# Patient Record
Sex: FEMALE | URBAN - NONMETROPOLITAN AREA
[De-identification: names, ages, dates, MRNs, and addresses within clinical notes are randomized per-mention and may not be internally consistent; named-entity substitution may affect disease eponyms.]

---

## 2021-05-07 ENCOUNTER — DOCTOR'S OFFICE (OUTPATIENT)
Dept: URBAN - NONMETROPOLITAN AREA CLINIC 1 | Facility: CLINIC | Age: 21
Setting detail: OPHTHALMOLOGY
End: 2021-05-07
Payer: COMMERCIAL

## 2021-05-07 DIAGNOSIS — H52.13: ICD-10-CM

## 2021-05-07 DIAGNOSIS — Z01.00: ICD-10-CM

## 2021-05-07 PROCEDURE — 92004 COMPRE OPH EXAM NEW PT 1/>: CPT | Performed by: OPTOMETRIST

## 2021-05-07 ASSESSMENT — REFRACTION_MANIFEST
OS_SPHERE: -0.25
OS_AXIS: 090
OS_VA2: 20/20-2
OS_CYLINDER: -0.50
OD_VA1: 20/20
OS_VA1: 20/20-2
OD_VA2: 20/20
OD_CYLINDER: SPH
OD_SPHERE: -0.25

## 2021-05-07 ASSESSMENT — REFRACTION_AUTOREFRACTION
OD_CYLINDER: SPH
OD_SPHERE: -0.50
OS_CYLINDER: -0.50
OS_SPHERE: -0.50
OS_AXIS: 90

## 2021-05-07 ASSESSMENT — SPHEQUIV_DERIVED
OS_SPHEQUIV: -0.75
OS_SPHEQUIV: -0.5

## 2021-05-07 ASSESSMENT — CONFRONTATIONAL VISUAL FIELD TEST (CVF)
OS_FINDINGS: FULL
OD_FINDINGS: FULL

## 2021-05-07 ASSESSMENT — VISUAL ACUITY
OD_BCVA: 20/30
OS_BCVA: 20/20

## 2021-05-07 ASSESSMENT — REFRACTION_CURRENTRX
OS_OVR_VA: 20/
OD_OVR_VA: 20/

## 2022-05-10 ENCOUNTER — DOCTOR'S OFFICE (OUTPATIENT)
Dept: URBAN - NONMETROPOLITAN AREA CLINIC 1 | Facility: CLINIC | Age: 22
Setting detail: OPHTHALMOLOGY
End: 2022-05-10
Payer: COMMERCIAL

## 2022-05-10 DIAGNOSIS — Z01.00: ICD-10-CM

## 2022-05-10 DIAGNOSIS — H52.13: ICD-10-CM

## 2022-05-10 PROCEDURE — 92014 COMPRE OPH EXAM EST PT 1/>: CPT | Performed by: OPTOMETRIST

## 2022-05-10 ASSESSMENT — CONFRONTATIONAL VISUAL FIELD TEST (CVF)
OD_FINDINGS: FULL
OS_FINDINGS: FULL

## 2022-05-10 ASSESSMENT — SPHEQUIV_DERIVED
OS_SPHEQUIV: -0.625
OS_SPHEQUIV: -0.25
OD_SPHEQUIV: -0.25

## 2022-05-10 ASSESSMENT — VISUAL ACUITY
OS_BCVA: 20/20
OD_BCVA: 20/40-

## 2022-05-10 ASSESSMENT — REFRACTION_AUTOREFRACTION
OD_CYLINDER: -1.50
OS_SPHERE: +0.25
OS_AXIS: 083
OS_CYLINDER: -1.00
OD_SPHERE: +0.50
OD_AXIS: 091

## 2022-05-10 ASSESSMENT — REFRACTION_MANIFEST
OD_CYLINDER: SPH
OD_SPHERE: -0.25
OS_AXIS: 085
OS_SPHERE: -0.25
OD_VA1: 20/20
OD_VA2: 20/20
OS_CYLINDER: -0.75
OS_VA2: 20/20-2
OS_VA1: 20/20-2

## 2022-05-10 ASSESSMENT — TONOMETRY
OD_IOP_MMHG: 15
OS_IOP_MMHG: 15

## 2022-05-10 ASSESSMENT — REFRACTION_CURRENTRX
OS_OVR_VA: 20/
OD_OVR_VA: 20/

## 2023-06-19 ENCOUNTER — RX ONLY (RX ONLY)
Age: 23
End: 2023-06-19

## 2023-06-19 ENCOUNTER — DOCTOR'S OFFICE (OUTPATIENT)
Dept: URBAN - NONMETROPOLITAN AREA CLINIC 1 | Facility: CLINIC | Age: 23
Setting detail: OPHTHALMOLOGY
End: 2023-06-19
Payer: COMMERCIAL

## 2023-06-19 DIAGNOSIS — H52.222: ICD-10-CM

## 2023-06-19 PROCEDURE — 92014 COMPRE OPH EXAM EST PT 1/>: CPT | Performed by: OPTOMETRIST

## 2023-06-19 ASSESSMENT — REFRACTION_AUTOREFRACTION
OD_CYLINDER: 0.00
OS_CYLINDER: -1.25
OD_SPHERE: -0.25
OS_SPHERE: +0.25
OS_AXIS: 088
OD_AXIS: 0

## 2023-06-19 ASSESSMENT — SPHEQUIV_DERIVED
OS_SPHEQUIV: -0.375
OD_SPHEQUIV: -0.25

## 2023-06-19 ASSESSMENT — TONOMETRY
OD_IOP_MMHG: 15
OS_IOP_MMHG: 15

## 2023-06-19 ASSESSMENT — REFRACTION_MANIFEST
OD_CYLINDER: SPH
OS_AXIS: 088
OD_VA1: 20/20
OS_VA1: 20/20-2
OD_VA2: 20/20
OS_SPHERE: PLANO
OD_SPHERE: -0.25
OS_CYLINDER: -1.25
OS_VA2: 20/20-2

## 2023-06-19 ASSESSMENT — CONFRONTATIONAL VISUAL FIELD TEST (CVF)
OS_FINDINGS: FULL
OD_FINDINGS: FULL

## 2023-06-19 ASSESSMENT — VISUAL ACUITY
OS_BCVA: 20/20-1
OD_BCVA: 20/25

## 2023-06-19 ASSESSMENT — REFRACTION_CURRENTRX
OD_OVR_VA: 20/
OS_OVR_VA: 20/

## 2024-06-21 ENCOUNTER — DOCTOR'S OFFICE (OUTPATIENT)
Dept: URBAN - NONMETROPOLITAN AREA CLINIC 1 | Facility: CLINIC | Age: 24
Setting detail: OPHTHALMOLOGY
End: 2024-06-21
Payer: COMMERCIAL

## 2024-06-21 DIAGNOSIS — H52.13: ICD-10-CM

## 2024-06-21 DIAGNOSIS — H52.222: ICD-10-CM

## 2024-06-21 PROCEDURE — 92014 COMPRE OPH EXAM EST PT 1/>: CPT | Performed by: OPTOMETRIST

## 2024-06-21 ASSESSMENT — CONFRONTATIONAL VISUAL FIELD TEST (CVF)
OD_FINDINGS: FULL
OS_FINDINGS: FULL

## 2024-11-04 ENCOUNTER — OFFICE VISIT (OUTPATIENT)
Dept: PODIATRY | Age: 24
End: 2024-11-04

## 2024-11-04 VITALS
HEART RATE: 84 BPM | SYSTOLIC BLOOD PRESSURE: 122 MMHG | HEIGHT: 66 IN | DIASTOLIC BLOOD PRESSURE: 80 MMHG | OXYGEN SATURATION: 98 % | TEMPERATURE: 98.1 F | BODY MASS INDEX: 28.57 KG/M2 | WEIGHT: 177.8 LBS

## 2024-11-04 DIAGNOSIS — M79.674 GREAT TOE PAIN, RIGHT: Primary | ICD-10-CM

## 2024-11-04 DIAGNOSIS — L60.0 IGTN (INGROWING TOE NAIL): ICD-10-CM

## 2024-11-04 PROCEDURE — 99203 OFFICE O/P NEW LOW 30 MIN: CPT | Performed by: STUDENT IN AN ORGANIZED HEALTH CARE EDUCATION/TRAINING PROGRAM

## 2024-11-04 PROCEDURE — 11765 WEDGE EXCISION SKN NAIL FOLD: CPT | Performed by: STUDENT IN AN ORGANIZED HEALTH CARE EDUCATION/TRAINING PROGRAM

## 2024-11-04 RX ORDER — NORETHINDRONE ACETATE AND ETHINYL ESTRADIOL 1.5-30(21)
1 KIT ORAL DAILY
COMMUNITY
Start: 2024-09-26

## 2024-11-04 RX ORDER — ROPINIROLE 0.25 MG/1
TABLET, FILM COATED ORAL
COMMUNITY

## 2024-11-04 RX ORDER — MUPIROCIN 20 MG/G
OINTMENT TOPICAL
COMMUNITY
Start: 2024-10-14

## 2024-11-04 NOTE — PATIENT INSTRUCTIONS
POST-OPERATIVE INSTRUCTIONS FOLLOWING NAIL REMOVAL    TODAY  Leave dressing intact. Rest. Take ibuprofen or tylenol as needed. By tomorrow most of the bleeding should stop. Some light bleeding the first 24 hours is normal.  TOMORROW AM  Remove the dressing, if it sticks, get it wet with water  Soak the toe in warm water for 10 minutes, you may add epsom salts if you want but they're not mandatory  Dry toe, apply small amount of neosporin or bacitracin or generic antibiotic ointment to the nail bed and cover with a standard bandaid  TOMORROW PM  Remove the dressing, if it sticks, get it wet with water  Soak the toe in warm water for 10 minutes, you may add epsom salts if you want  Dry toe, apply small amount of neosporin or bacitracin or generic antibiotic ointment to the nail bed and cover with a standard bandaid  NEXT 5 DAYS  Repeat steps 2 and 3.   After 5 days you can stop applying neosporin. Just clean the toe daily with soap and water and cover with a bandaid  Once all drainage stops, you can stop placing bandaid on the toe      You may notice some redness on the side of the nail bed. This is ok. Some minor bleeding or clear-yellowish drainage is normal. If your toe begins to drain thick, white creamy drainage (pus) call immediately. If you notice redness streaking onto the foot and ankle, call our office immediately. Infections are rare after these procedures but we may have to prescribe an antibiotic.

## 2024-11-04 NOTE — PROGRESS NOTES
"Ambulatory Visit  Name: Amelie Mccann      : 2000      MRN: [id number]  Encounter Provider: Samantha Baker DPM  Encounter Date: 2024   Encounter department: [facility] [place]    Assessment & Plan  Great toe pain, right         IGTN (ingrowing toe nail)           PLAN:  I reviewed clinical exam with patient in detail today. I have discussed with the patient the pathophysiology of this diagnosis and reviewed how the examination correlates with this diagnosis.  PCP note from 10/14/24 reviewed   Patient has acute right 1st toe lateral border IGTN. PNA nonperm performed as below.  Aftercare instructions given  F/u 2wks for recheck     Nail removal    Date/Time: 2024 10:30 AM    Performed by: Samantha Baker DPM  Authorized by: Samantha Baker DPM    Patient location:  Clinic  Indications / Diagnosis:  Right 1st toe lateral border IGTN  Universal Protocol:  procedure performed by consultantConsent: Verbal consent obtained.  Risks and benefits: risks, benefits and alternatives were discussed  Consent given by: patient  Time out: Immediately prior to procedure a \"time out\" was called to verify the correct patient, procedure, equipment, support staff and site/side marked as required.  Patient understanding: patient states understanding of the procedure being performed  Patient consent: the patient's understanding of the procedure matches consent given  Patient identity confirmed: verbally with patient    Location:     Foot:  R big toe  Pre-procedure details:     Skin preparation:  Betadine and alcohol  Anesthesia (see MAR for exact dosages):     Anesthesia method:  Local infiltration    Local anesthetic:  Lidocaine 1% w/o epi  Nail Removal:     Nail removed:  Partial    Nail side:  Lateral    Nail bed sutured: no    Ingrown nail:     Wedge excision of skin: yes (silver nitrate used to cauterize)      Nail matrix removed or ablated:  None  Post-procedure details:     Dressing:  " "4x4 sterile gauze and antibiotic ointment    Patient tolerance of procedure:  Tolerated well, no immediate complications       History of Present Illness     Amelie Mccann is a 23 y.o. female who presents to clinic today concerning possible right IGTN. Notes pain, drainage for a few weeks. Tried to cut nail out by herself which helped a bit but the pain returned. Using abx ointment rx'd by PCP.       Review of Systems   Constitutional:  Negative for activity change, chills and fever.   HENT: Negative.     Respiratory:  Negative for cough, chest tightness and shortness of breath.    Cardiovascular:  Negative for chest pain and leg swelling.   Endocrine: Negative.    Genitourinary: Negative.    Neurological: Negative.  Negative for numbness.   Psychiatric/Behavioral: Negative.  Negative for agitation and behavioral problems.            Objective     /80 (BP Location: Left arm, Patient Position: Sitting, Cuff Size: Standard)   Pulse 84   Temp 98.1 °F (36.7 °C) (Temporal)   Ht 5' 6\" (1.676 m)   Wt 80.6 kg (177 lb 12.8 oz)   SpO2 98%   BMI 28.70 kg/m²     Physical Exam  Vitals and nursing note reviewed.   Constitutional:       General: She is not in acute distress.     Appearance: She is well-developed.   Pulmonary:      Effort: Pulmonary effort is normal. No respiratory distress.   Musculoskeletal:         General: Tenderness (R1 lateral border) present. No swelling.   Skin:     General: Skin is warm and dry.      Capillary Refill: Capillary refill takes less than 2 seconds.      Comments: Right 1st toe lateral border with ingrowth and overlapping skin. Scant serous drainage. No erythema or purulence.    Neurological:      Mental Status: She is alert.   Psychiatric:         Mood and Affect: Mood normal.         "

## 2024-11-05 ENCOUNTER — TELEPHONE (OUTPATIENT)
Dept: PODIATRY | Age: 24
End: 2024-11-05

## 2024-11-05 NOTE — TELEPHONE ENCOUNTER
Left message for patient to call back and confirm 2 week follow up is scheduled for 11/18/24 at 0945 with Dr. Baker

## 2025-06-24 ENCOUNTER — DOCTOR'S OFFICE (OUTPATIENT)
Dept: URBAN - NONMETROPOLITAN AREA CLINIC 1 | Facility: CLINIC | Age: 25
Setting detail: OPHTHALMOLOGY
End: 2025-06-24
Payer: COMMERCIAL

## 2025-06-24 DIAGNOSIS — H52.13: ICD-10-CM

## 2025-06-24 DIAGNOSIS — H52.222: ICD-10-CM

## 2025-06-24 PROCEDURE — 92014 COMPRE OPH EXAM EST PT 1/>: CPT | Performed by: OPTOMETRIST

## 2025-06-24 ASSESSMENT — KERATOMETRY
OS_K1POWER_DIOPTERS: 42.50
OS_AXISANGLE_DEGREES: 008
OS_K2POWER_DIOPTERS: 42.75

## 2025-06-24 ASSESSMENT — REFRACTION_MANIFEST
OD_AXIS: 090
OD_VA1: 20/20
OD_CYLINDER: -0.25
OS_SPHERE: PLANO
OS_AXIS: 095
OD_SPHERE: -0.25
OS_VA2: 20/20-2
OS_VA1: 20/20-2
OS_CYLINDER: -1.00
OD_VA2: 20/20

## 2025-06-24 ASSESSMENT — CONFRONTATIONAL VISUAL FIELD TEST (CVF)
OS_FINDINGS: FULL
OD_FINDINGS: FULL

## 2025-06-24 ASSESSMENT — REFRACTION_CURRENTRX
OD_OVR_VA: 20/
OS_OVR_VA: 20/

## 2025-06-24 ASSESSMENT — TONOMETRY
OS_IOP_MMHG: 15
OD_IOP_MMHG: 15

## 2025-06-24 ASSESSMENT — REFRACTION_AUTOREFRACTION
OD_CYLINDER: -1.00
OS_CYLINDER: -1.25
OS_AXIS: 097
OD_AXIS: 080
OD_SPHERE: +0.25
OS_SPHERE: 0.00

## 2025-06-24 ASSESSMENT — VISUAL ACUITY
OS_BCVA: 20/25-1
OD_BCVA: 20/50+2